# Patient Record
Sex: MALE | Race: WHITE | NOT HISPANIC OR LATINO | URBAN - METROPOLITAN AREA
[De-identification: names, ages, dates, MRNs, and addresses within clinical notes are randomized per-mention and may not be internally consistent; named-entity substitution may affect disease eponyms.]

---
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
## 2025-07-16 ENCOUNTER — OFFICE VISIT (OUTPATIENT)
Dept: MEDICAL GROUP | Facility: MEDICAL CENTER | Age: 20
End: 2025-07-16
Payer: COMMERCIAL

## 2025-07-16 VITALS
BODY MASS INDEX: 21.51 KG/M2 | DIASTOLIC BLOOD PRESSURE: 54 MMHG | WEIGHT: 145.2 LBS | HEART RATE: 52 BPM | TEMPERATURE: 98 F | OXYGEN SATURATION: 99 % | SYSTOLIC BLOOD PRESSURE: 100 MMHG | HEIGHT: 69 IN

## 2025-07-16 DIAGNOSIS — S43.014D ANTERIOR DISLOCATION OF RIGHT SHOULDER, SUBSEQUENT ENCOUNTER: ICD-10-CM

## 2025-07-16 DIAGNOSIS — Z00.00 ENCOUNTER FOR MEDICAL EXAMINATION TO ESTABLISH CARE: ICD-10-CM

## 2025-07-16 PROCEDURE — 3078F DIAST BP <80 MM HG: CPT | Performed by: STUDENT IN AN ORGANIZED HEALTH CARE EDUCATION/TRAINING PROGRAM

## 2025-07-16 PROCEDURE — 99203 OFFICE O/P NEW LOW 30 MIN: CPT | Performed by: STUDENT IN AN ORGANIZED HEALTH CARE EDUCATION/TRAINING PROGRAM

## 2025-07-16 PROCEDURE — 3074F SYST BP LT 130 MM HG: CPT | Performed by: STUDENT IN AN ORGANIZED HEALTH CARE EDUCATION/TRAINING PROGRAM

## 2025-07-16 ASSESSMENT — PATIENT HEALTH QUESTIONNAIRE - PHQ9: CLINICAL INTERPRETATION OF PHQ2 SCORE: 0

## 2025-07-16 NOTE — PROGRESS NOTES
"Subjective:     CC:  Diagnoses of Encounter for medical examination to establish care and Anterior dislocation of right shoulder, subsequent encounter were pertinent to this visit.    HISTORY OF THE PRESENT ILLNESS: Patient is a 20 y.o. male. This pleasant patient is here today to establish care and discuss current right shoulder issue.    He is generally healthy.  He does not have any concerns apart from a recurrent right shoulder issue.  He states that he first dislocated his shoulder playing football when he was in high school.  Since then it has been dislocating several times.  His dad is a physical therapist so he has been working on exercise.  He is getting to the point where he would like a referral to evaluate further.    ROS:   ROS      Objective:     Exam: /54   Pulse (!) 52   Temp 36.7 °C (98 °F) (Temporal)   Ht 1.765 m (5' 9.49\")   Wt 65.9 kg (145 lb 3.2 oz)   SpO2 99%  Body mass index is 21.14 kg/m².    Physical Exam  Vitals reviewed.   Constitutional:       General: He is not in acute distress.     Appearance: He is not toxic-appearing.   HENT:      Head: Normocephalic and atraumatic.      Right Ear: External ear normal.      Left Ear: External ear normal.   Eyes:      General:         Right eye: No discharge.         Left eye: No discharge.      Extraocular Movements: Extraocular movements intact.      Conjunctiva/sclera: Conjunctivae normal.   Pulmonary:      Effort: Pulmonary effort is normal. No respiratory distress.   Skin:     General: Skin is warm and dry.   Neurological:      Mental Status: He is alert.   Psychiatric:         Mood and Affect: Mood normal.         Behavior: Behavior normal.         Thought Content: Thought content normal.         Judgment: Judgment normal.           Assessment & Plan:   20 y.o. male with the following -    1. Encounter for medical examination to establish care  We reviewed medical history, family history, medications and problem list    2. Anterior " dislocation of right shoulder, subsequent encounter  Chronic, uncontrolled. We discussed that with multiple dislocations more than likely this will continue without intervention.  Referral to Dr. Xiong sent  - Referral to Orthopedics    No follow-ups on file.    Please note that this dictation was created using voice recognition software. I have made every reasonable attempt to correct obvious errors, but I expect that there are errors of grammar and possibly content that I did not discover before finalizing the note.